# Patient Record
Sex: MALE | Race: WHITE | Employment: STUDENT | URBAN - METROPOLITAN AREA
[De-identification: names, ages, dates, MRNs, and addresses within clinical notes are randomized per-mention and may not be internally consistent; named-entity substitution may affect disease eponyms.]

---
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
## 2021-01-26 ENCOUNTER — LAB ENCOUNTER (OUTPATIENT)
Dept: LAB | Age: 21
End: 2021-01-26
Attending: PEDIATRICS
Payer: COMMERCIAL

## 2021-01-26 DIAGNOSIS — R51.9 NONINTRACTABLE HEADACHE, UNSPECIFIED CHRONICITY PATTERN, UNSPECIFIED HEADACHE TYPE: ICD-10-CM

## 2021-01-26 DIAGNOSIS — Z20.828 EXPOSURE TO SARS-ASSOCIATED CORONAVIRUS: ICD-10-CM

## 2021-01-27 LAB — SARS-COV-2 RNA RESP QL NAA+PROBE: NOT DETECTED

## 2021-09-02 ENCOUNTER — APPOINTMENT (OUTPATIENT)
Dept: GENERAL RADIOLOGY | Age: 21
End: 2021-09-02
Attending: EMERGENCY MEDICINE
Payer: COMMERCIAL

## 2021-09-02 ENCOUNTER — HOSPITAL ENCOUNTER (OUTPATIENT)
Age: 21
Discharge: HOME | End: 2021-09-02
Attending: EMERGENCY MEDICINE
Payer: COMMERCIAL

## 2021-09-02 VITALS
TEMPERATURE: 98 F | HEART RATE: 77 BPM | OXYGEN SATURATION: 98 % | RESPIRATION RATE: 20 BRPM | BODY MASS INDEX: 26.6 KG/M2 | HEIGHT: 71 IN | WEIGHT: 190 LBS | DIASTOLIC BLOOD PRESSURE: 45 MMHG | SYSTOLIC BLOOD PRESSURE: 106 MMHG

## 2021-09-02 DIAGNOSIS — S29.011A INTERCOSTAL MUSCLE STRAIN, INITIAL ENCOUNTER: Primary | ICD-10-CM

## 2021-09-02 PROCEDURE — 99213 OFFICE O/P EST LOW 20 MIN: CPT

## 2021-09-02 PROCEDURE — 71046 X-RAY EXAM CHEST 2 VIEWS: CPT | Performed by: EMERGENCY MEDICINE

## 2021-09-02 NOTE — ED INITIAL ASSESSMENT (HPI)
Right rib pain started last night 10 or 11 pm.denies any injury. Pain when breathing. No pain meds taken.

## 2021-09-02 NOTE — ED PROVIDER NOTES
Patient Seen in: [facility] [place]      History   Patient presents with:  Pain    Stated Complaint: rib area pain    HPI/Subjective:   HPI    71-year-old male with history of anxiety/depression who comes in with focal tenderness of the right low data to display       XR CHEST PA + LAT CHEST (CPT=71046)    Result Date: 9/2/2021  PROCEDURE:  XR CHEST PA + LAT CHEST (CPT=71046)  INDICATIONS:  rib area pain  COMPARISON:  [facility], XR, XR CHEST PA + LAT CHEST (CPT=71020), 4/22/2016, 4:18 PM.  [facility]

## 2021-09-03 ENCOUNTER — APPOINTMENT (OUTPATIENT)
Dept: CT IMAGING | Age: 21
End: 2021-09-03
Attending: EMERGENCY MEDICINE
Payer: COMMERCIAL

## 2021-09-03 ENCOUNTER — HOSPITAL ENCOUNTER (EMERGENCY)
Age: 21
Discharge: HOME | End: 2021-09-03
Attending: EMERGENCY MEDICINE
Payer: COMMERCIAL

## 2021-09-03 VITALS
OXYGEN SATURATION: 98 % | HEIGHT: 71 IN | HEART RATE: 78 BPM | BODY MASS INDEX: 26.6 KG/M2 | RESPIRATION RATE: 16 BRPM | DIASTOLIC BLOOD PRESSURE: 67 MMHG | SYSTOLIC BLOOD PRESSURE: 127 MMHG | TEMPERATURE: 97 F | WEIGHT: 190 LBS

## 2021-09-03 DIAGNOSIS — J18.9 PNEUMONIA OF RIGHT LOWER LOBE DUE TO INFECTIOUS ORGANISM: Primary | ICD-10-CM

## 2021-09-03 LAB
ALBUMIN SERPL-MCNC: 4.6 G/DL (ref 3.4–5)
ALBUMIN/GLOB SERPL: 1.3 {RATIO} (ref 1–2)
ALP LIVER SERPL-CCNC: 77 U/L
ALT SERPL-CCNC: 47 U/L
ANION GAP SERPL CALC-SCNC: 3 MMOL/L (ref 0–18)
AST SERPL-CCNC: 20 U/L (ref 15–37)
BASOPHILS # BLD AUTO: 0.05 X10(3) UL (ref 0–0.2)
BASOPHILS NFR BLD AUTO: 0.4 %
BILIRUB SERPL-MCNC: 1.4 MG/DL (ref 0.1–2)
BUN BLD-MCNC: 12 MG/DL (ref 7–18)
CALCIUM BLD-MCNC: 9.3 MG/DL (ref 8.5–10.1)
CHLORIDE SERPL-SCNC: 105 MMOL/L (ref 98–112)
CO2 SERPL-SCNC: 29 MMOL/L (ref 21–32)
CREAT BLD-MCNC: 1.09 MG/DL
D-DIMER: 0.27 UG/ML FEU (ref ?–0.5)
EOSINOPHIL # BLD AUTO: 0.18 X10(3) UL (ref 0–0.7)
EOSINOPHIL NFR BLD AUTO: 1.6 %
ERYTHROCYTE [DISTWIDTH] IN BLOOD BY AUTOMATED COUNT: 13.3 %
GLOBULIN PLAS-MCNC: 3.6 G/DL (ref 2.8–4.4)
GLUCOSE BLD-MCNC: 97 MG/DL (ref 70–99)
HCT VFR BLD AUTO: 46 %
HGB BLD-MCNC: 15.3 G/DL
IMM GRANULOCYTES # BLD AUTO: 0.07 X10(3) UL (ref 0–1)
IMM GRANULOCYTES NFR BLD: 0.6 %
LIPASE SERPL-CCNC: 54 U/L (ref 73–393)
LYMPHOCYTES # BLD AUTO: 2.32 X10(3) UL (ref 1–4)
LYMPHOCYTES NFR BLD AUTO: 20.9 %
M PROTEIN MFR SERPL ELPH: 8.2 G/DL (ref 6.4–8.2)
MCH RBC QN AUTO: 27.6 PG (ref 26–34)
MCHC RBC AUTO-ENTMCNC: 33.3 G/DL (ref 31–37)
MCV RBC AUTO: 82.9 FL
MONOCYTES # BLD AUTO: 1.08 X10(3) UL (ref 0.1–1)
MONOCYTES NFR BLD AUTO: 9.7 %
NEUTROPHILS # BLD AUTO: 7.42 X10 (3) UL (ref 1.5–7.7)
NEUTROPHILS # BLD AUTO: 7.42 X10(3) UL (ref 1.5–7.7)
NEUTROPHILS NFR BLD AUTO: 66.8 %
OSMOLALITY SERPL CALC.SUM OF ELEC: 284 MOSM/KG (ref 275–295)
PLATELET # BLD AUTO: 270 10(3)UL (ref 150–450)
POTASSIUM SERPL-SCNC: 4 MMOL/L (ref 3.5–5.1)
RBC # BLD AUTO: 5.55 X10(6)UL
SODIUM SERPL-SCNC: 137 MMOL/L (ref 136–145)
WBC # BLD AUTO: 11.1 X10(3) UL (ref 4–11)

## 2021-09-03 PROCEDURE — 74177 CT ABD & PELVIS W/CONTRAST: CPT | Performed by: EMERGENCY MEDICINE

## 2021-09-03 PROCEDURE — 85025 COMPLETE CBC W/AUTO DIFF WBC: CPT | Performed by: EMERGENCY MEDICINE

## 2021-09-03 PROCEDURE — 36415 COLL VENOUS BLD VENIPUNCTURE: CPT

## 2021-09-03 PROCEDURE — 83690 ASSAY OF LIPASE: CPT | Performed by: EMERGENCY MEDICINE

## 2021-09-03 PROCEDURE — 99284 EMERGENCY DEPT VISIT MOD MDM: CPT

## 2021-09-03 PROCEDURE — 99285 EMERGENCY DEPT VISIT HI MDM: CPT

## 2021-09-03 PROCEDURE — 85379 FIBRIN DEGRADATION QUANT: CPT | Performed by: EMERGENCY MEDICINE

## 2021-09-03 PROCEDURE — 71260 CT THORAX DX C+: CPT | Performed by: EMERGENCY MEDICINE

## 2021-09-03 PROCEDURE — 80053 COMPREHEN METABOLIC PANEL: CPT | Performed by: EMERGENCY MEDICINE

## 2021-09-03 RX ORDER — DOXYCYCLINE 100 MG/1
100 CAPSULE ORAL 2 TIMES DAILY
Qty: 14 CAPSULE | Refills: 0 | Status: SHIPPED | OUTPATIENT
Start: 2021-09-03 | End: 2021-09-10

## 2021-09-03 RX ORDER — TRAMADOL HYDROCHLORIDE 50 MG/1
50 TABLET ORAL EVERY 6 HOURS PRN
Qty: 20 TABLET | Refills: 0 | Status: SHIPPED | OUTPATIENT
Start: 2021-09-03

## 2021-09-03 RX ORDER — BUPROPION HYDROCHLORIDE 75 MG/1
50 TABLET ORAL 2 TIMES DAILY
COMMUNITY

## 2021-09-03 NOTE — ED PROVIDER NOTES
Patient Seen in: THE [facility] In [place]      History   Patient presents with:  Pain    Stated Complaint: r rib pain and anterior shoulder pain, seen at urgent care yesterday BIC and ha*    HPI/Subjective:   HPI    28-year-old male pres no rigidity   Lungs: good air exchange and clear   Heart: regular rate rhythm and no murmur   Abdomen: Soft and nontender. No abdominal masses.   No peritoneal signs   MSK: No reproducible tenderness on palpation over the anterior chest wall on the right s FINDINGS:  LUNGS:  No focal consolidation. Normal vascularity. CARDIAC:  Normal size cardiac silhouette. MEDIASTINUM:  Normal. PLEURA:  No pleural effusion or pneumothorax. BONES:  Normal for age. No right rib lesion identified.             CONCLUSION:  N No visible dilatation or calcification. PANCREAS:  No lesion, fluid collection, ductal dilatation, or atrophy. SPLEEN:  No enlargement or focal lesion. KIDNEYS:  No mass, obstruction, or calcification. ADRENALS:  No mass or enlargement.   AORTA:  No ane be causing radiation up into the shoulder. Discussed this with the patient and family and will treat for an infectious process. Home with doxycycline for possible early pneumonia and tramadol for pain management.   Instructed to follow-up with primary car

## 2023-09-26 ENCOUNTER — HOSPITAL ENCOUNTER (OUTPATIENT)
Age: 23
Discharge: HOME | End: 2023-09-26
Payer: COMMERCIAL

## 2023-09-26 VITALS
RESPIRATION RATE: 18 BRPM | HEART RATE: 66 BPM | DIASTOLIC BLOOD PRESSURE: 69 MMHG | BODY MASS INDEX: 27 KG/M2 | OXYGEN SATURATION: 99 % | TEMPERATURE: 97 F | WEIGHT: 195 LBS | SYSTOLIC BLOOD PRESSURE: 124 MMHG

## 2023-09-26 DIAGNOSIS — J06.9 VIRAL URI: Primary | ICD-10-CM

## 2023-09-26 LAB
S PYO AG THROAT QL IA.RAPID: NEGATIVE
SARS-COV-2 RNA RESP QL NAA+PROBE: NOT DETECTED

## 2023-09-26 PROCEDURE — 99212 OFFICE O/P EST SF 10 MIN: CPT

## 2023-09-26 PROCEDURE — 87651 STREP A DNA AMP PROBE: CPT | Performed by: NURSE PRACTITIONER

## 2023-09-26 PROCEDURE — 99213 OFFICE O/P EST LOW 20 MIN: CPT

## 2023-09-26 NOTE — DISCHARGE INSTRUCTIONS
Rest and drink plenty of fluids. This will help to thin the mucous in the back of your throat. Take Tylenol and/or ibuprofen as needed for pain or fever. Use Zyrtec, Claritin, or Allegra to help with nasal drainage. You can also take Sudafed to help with sinus pressure or pain. Get the medication behind the pharmacy counter. Take Robitussin or Delsym as needed for cough. Follow up with your PCP in 5-7 days. Your symptoms should improve in the next 7-10 days; however, the cough can linger for much longer. Thank you for choosing [place] [address] for your care.

## 2023-10-24 ENCOUNTER — OFFICE VISIT (OUTPATIENT)
Dept: URGENT CARE | Age: 23
End: 2023-10-24

## 2023-10-24 ENCOUNTER — APPOINTMENT (OUTPATIENT)
Dept: URGENT CARE | Age: 23
End: 2023-10-24

## 2023-10-24 VITALS
SYSTOLIC BLOOD PRESSURE: 108 MMHG | BODY MASS INDEX: 27.92 KG/M2 | OXYGEN SATURATION: 97 % | DIASTOLIC BLOOD PRESSURE: 72 MMHG | WEIGHT: 195 LBS | TEMPERATURE: 99 F | RESPIRATION RATE: 16 BRPM | HEIGHT: 70 IN | HEART RATE: 98 BPM

## 2023-10-24 DIAGNOSIS — J02.9 SORE THROAT: Primary | ICD-10-CM

## 2023-10-24 DIAGNOSIS — U07.1 COVID-19 VIRUS INFECTION: ICD-10-CM

## 2023-10-24 LAB
FLUAV AG UPPER RESP QL IA.RAPID: NEGATIVE
FLUBV AG UPPER RESP QL IA.RAPID: NEGATIVE
INTERNAL PROCEDURAL CONTROLS ACCEPTABLE: YES
S PYO AG THROAT QL IA.RAPID: NEGATIVE
SARS-COV+SARS-COV-2 AG RESP QL IA.RAPID: DETECTED
TEST LOT EXPIRATION DATE: ABNORMAL
TEST LOT EXPIRATION DATE: NORMAL
TEST LOT NUMBER: ABNORMAL
TEST LOT NUMBER: NORMAL

## 2023-10-24 PROCEDURE — 99202 OFFICE O/P NEW SF 15 MIN: CPT | Performed by: NURSE PRACTITIONER

## 2023-10-24 PROCEDURE — 87428 SARSCOV & INF VIR A&B AG IA: CPT | Performed by: NURSE PRACTITIONER

## 2023-10-24 PROCEDURE — 87880 STREP A ASSAY W/OPTIC: CPT | Performed by: NURSE PRACTITIONER

## 2023-10-24 RX ORDER — BENZONATATE 100 MG/1
100 CAPSULE ORAL 3 TIMES DAILY PRN
Qty: 21 CAPSULE | Refills: 0 | Status: SHIPPED | OUTPATIENT
Start: 2023-10-24 | End: 2023-10-31

## 2023-10-24 ASSESSMENT — ENCOUNTER SYMPTOMS
CHILLS: 1
DIAPHORESIS: 0
FEVER: 1
COUGH: 1
CHANGE IN BOWEL HABIT: 0
FATIGUE: 1
SWOLLEN GLANDS: 0
SORE THROAT: 1
VOMITING: 0
NAUSEA: 1
HEADACHES: 1
ABDOMINAL PAIN: 0

## 2023-10-24 ASSESSMENT — PAIN SCALES - GENERAL: PAINLEVEL: 7

## 2024-02-02 ENCOUNTER — HOSPITAL ENCOUNTER (OUTPATIENT)
Age: 24
Discharge: HOME | End: 2024-02-02
Payer: COMMERCIAL

## 2024-02-02 VITALS
WEIGHT: 185 LBS | RESPIRATION RATE: 16 BRPM | HEIGHT: 71 IN | SYSTOLIC BLOOD PRESSURE: 130 MMHG | DIASTOLIC BLOOD PRESSURE: 77 MMHG | BODY MASS INDEX: 25.9 KG/M2 | TEMPERATURE: 97 F | HEART RATE: 73 BPM | OXYGEN SATURATION: 97 %

## 2024-02-02 DIAGNOSIS — M53.82 MUSCULOSKELETAL DISORDER INVOLVING SUBOCCIPITAL REGION: Primary | ICD-10-CM

## 2024-02-02 PROBLEM — F41.9 ANXIETY DISORDER: Status: ACTIVE | Noted: 2024-02-02

## 2024-02-02 PROBLEM — F32.A DEPRESSIVE DISORDER: Status: ACTIVE | Noted: 2024-02-02

## 2024-02-02 PROCEDURE — 99213 OFFICE O/P EST LOW 20 MIN: CPT

## 2024-02-02 PROCEDURE — 99214 OFFICE O/P EST MOD 30 MIN: CPT

## 2024-02-02 RX ORDER — NAPROXEN 500 MG/1
500 TABLET ORAL 2 TIMES DAILY PRN
Qty: 20 TABLET | Refills: 0 | Status: SHIPPED | OUTPATIENT
Start: 2024-02-02

## 2024-02-02 NOTE — DISCHARGE INSTRUCTIONS
Supportive Care measures for musculoskeletal pain    - Alternate ice / heat as tolerated   - Drink plenty of water (approx. 4-6 bottle equivalents per day)   - Naproxen, Ibuprofen, or Tylenol for pain as needed   - You may benefit from over-the-counter topical pain medication such as: Diclofenac (Voltaren), Icy/Hot, Biofreeze, Bengay, etc.   - You may benefit from massage, acupuncture, and/or dry needling   - Avoid excessive twisting / bending / lifting   - Avoid looking down (e.g. texting) as this worsens strain   - Use proper body posture (sitting up straight, shoulder back, etc.)   - Expect symptoms to start improving over next few days

## 2024-02-02 NOTE — ED PROVIDER NOTES
History     Chief Complaint   Patient presents with    Headache       Subjective:   HPI    Yahir Bhakta, 23 year old male with notable medical history of n/a who presents with headache.  Patient reports HA started approx 4-5 days ago w/o precipitating events, and with pain sometimes wrapping around the sides of his head. Denies vision changes, n/v, falls, trauma.  PATIENT does admit to doing squats with a heavy bar across his upper back / shoulders the day before symptoms starting. He is taking Tylenol w/o much relief. Admits to adequate water intake.        Objective:   Past Medical History:   Diagnosis Date    Anxiety               Past Surgical History:   Procedure Laterality Date    ORAL SURGERY                  Social History     Socioeconomic History    Marital status: Single   Tobacco Use    Smoking status: Never     Passive exposure: Never    Smokeless tobacco: Never   Vaping Use    Vaping Use: Never used   Substance and Sexual Activity    Alcohol use: Never    Drug use: Never              Review of Systems   Neurological:  Positive for headaches.   All other systems reviewed and are negative.        Constitutional and vital signs reviewed.      All other systems reviewed and negative except as noted above.    I have reviewed the family history, social history, allergies, and outpatient medications.     History reviewed from EMR: Encounters, problem list, allergies, medications      Physical Exam     ED Triage Vitals   BP 02/02/24 1408 130/77   Pulse 02/02/24 1408 73   Resp 02/02/24 1408 16   Temp 02/02/24 1412 96.7 °F (35.9 °C)   Temp src 02/02/24 1412 Temporal   SpO2 02/02/24 1408 97 %   O2 Device 02/02/24 1408 None (Room air)       Current:/77   Pulse 73   Temp 96.7 °F (35.9 °C) (Temporal)   Resp 16   Ht 180.3 cm (5' 11\")   Wt 83.9 kg   SpO2 97%   BMI 25.80 kg/m²       Physical Exam  Vitals and nursing note reviewed.   Constitutional:       General: He is not in acute distress.      Appearance: Normal appearance. He is normal weight. He is not ill-appearing or toxic-appearing.   HENT:      Head: Normocephalic and atraumatic.      Right Ear: External ear normal.      Left Ear: External ear normal.      Nose: Nose normal. No congestion or rhinorrhea.      Mouth/Throat:      Mouth: Mucous membranes are moist.   Eyes:      Extraocular Movements: Extraocular movements intact.      Conjunctiva/sclera: Conjunctivae normal.      Pupils: Pupils are equal, round, and reactive to light.   Neck:        Comments: Tenderness to palpation of occipital musculature.  Cardiovascular:      Rate and Rhythm: Normal rate.      Pulses: Normal pulses.   Pulmonary:      Effort: Pulmonary effort is normal. No respiratory distress.   Musculoskeletal:         General: No swelling, tenderness or signs of injury. Normal range of motion.      Cervical back: Full passive range of motion without pain and normal range of motion. Muscular tenderness present. No spinous process tenderness.   Skin:     General: Skin is warm and dry.      Capillary Refill: Capillary refill takes less than 2 seconds.   Neurological:      General: No focal deficit present.      Mental Status: He is alert and oriented to person, place, and time. Mental status is at baseline.      GCS: GCS eye subscore is 4. GCS verbal subscore is 5. GCS motor subscore is 6.      Sensory: Sensation is intact.      Motor: Motor function is intact.      Coordination: Coordination is intact.      Gait: Gait is intact.   Psychiatric:         Attention and Perception: Attention normal.         Mood and Affect: Mood normal. Affect is flat.         Behavior: Behavior normal.         Thought Content: Thought content normal.         Judgment: Judgment normal.            ED Course     Labs Reviewed - No data to display  No orders to display       Vitals:    02/02/24 1408 02/02/24 1412   BP: 130/77    Pulse: 73    Resp: 16    Temp:  96.7 °F (35.9 °C)   TempSrc:  Temporal   SpO2:  97%    Weight: 83.9 kg    Height: 180.3 cm (5' 11\")             ELKIN MAXWELL Yony, 23 year old male with medical history as noted above who presents with headache   - Patient in NAD, VSS   - Benign neuro exam   - HPI and exam c/w muscle etiology   - Supportive care discussed   - School note provided        ** See ED course for additional information on care provided / interventions / notable events throughout patient's encounter.    ** See below for home care instructions (if applicable)         I have independently reviewed the radiology images, clinical lab results, and ECG tracings as described above (if applicable)        Medical Decision Making  Risk  OTC drugs.  Prescription drug management.        Disposition and Plan     Clinical Impression:  1. Musculoskeletal disorder involving suboccipital region         Disposition:  Discharge  2/2/2024  2:26 pm    Follow-up:  Nilton Morel MD  [address]  [place] [address]  [phone]      As needed          Medications Prescribed:  Discharge Medication List as of 2/2/2024  2:28 PM        START taking these medications    Details   naproxen 500 MG Oral Tab Take 1 tablet (500 mg total) by mouth 2 (two) times daily as needed., Normal, Disp-20 tablet, R-0             The above patient (and/or guardian) was made aware that an appropriate evaluation has been performed, and that no additional testing is required at this time. In my medical judgment, there is currently no evidence of an immediate life-threatening or surgical condition, therefore discharge is indicated at this time. The patient (and/or guardian) was advised that a small risk still exists that a serious condition could develop. The patient was instructed to arrange close follow-up with their primary care provider (or the referral provider given today). The patient received written and verbal instructions regarding their condition / concerns, demonstrated understanding, and is agreement  with the outpatient treatment plan.        Home care instructions:    Supportive Care measures for musculoskeletal pain    - Alternate ice / heat as tolerated   - Drink plenty of water (approx. 4-6 bottle equivalents per day)   - Naproxen, Ibuprofen, or Tylenol for pain as needed   - You may benefit from over-the-counter topical pain medication such as: Diclofenac (Voltaren), Icy/Hot, Biofreeze, Bengay, etc.   - You may benefit from massage, acupuncture, and/or dry needling   - Avoid excessive twisting / bending / lifting   - Avoid looking down (e.g. texting) as this worsens strain   - Use proper body posture (sitting up straight, shoulder back, etc.)   - Expect symptoms to start improving over next few days      Sergio Dotson, DNP, APRN, AGACNP-BC, FNP-C, CNL  Adult-Gerontology Acute Care & Family Nurse Practitioner  [facility]

## 2024-02-08 ENCOUNTER — HOSPITAL ENCOUNTER (OUTPATIENT)
Dept: GENERAL RADIOLOGY | Age: 24
Discharge: HOME | End: 2024-02-08
Attending: NURSE PRACTITIONER
Payer: COMMERCIAL

## 2024-02-08 DIAGNOSIS — G44.219 EPISODIC TENSION-TYPE HEADACHE, NOT INTRACTABLE: ICD-10-CM

## 2024-02-08 PROCEDURE — 72040 X-RAY EXAM NECK SPINE 2-3 VW: CPT | Performed by: NURSE PRACTITIONER

## 2024-07-26 ENCOUNTER — HOSPITAL ENCOUNTER (EMERGENCY)
Age: 24
Discharge: HOME | End: 2024-07-26
Attending: EMERGENCY MEDICINE
Payer: COMMERCIAL

## 2024-07-26 VITALS
HEIGHT: 70 IN | HEART RATE: 66 BPM | SYSTOLIC BLOOD PRESSURE: 120 MMHG | OXYGEN SATURATION: 97 % | BODY MASS INDEX: 25.77 KG/M2 | DIASTOLIC BLOOD PRESSURE: 71 MMHG | WEIGHT: 180 LBS | RESPIRATION RATE: 17 BRPM | TEMPERATURE: 97 F

## 2024-07-26 DIAGNOSIS — R07.89 CHEST PAIN, ATYPICAL: Primary | ICD-10-CM

## 2024-07-26 LAB
ALBUMIN SERPL-MCNC: 4.7 G/DL (ref 3.4–5)
ALBUMIN/GLOB SERPL: 1.4 {RATIO} (ref 1–2)
ALP LIVER SERPL-CCNC: 82 U/L
ALT SERPL-CCNC: 32 U/L
ANION GAP SERPL CALC-SCNC: 7 MMOL/L (ref 0–18)
AST SERPL-CCNC: 21 U/L (ref 15–37)
ATRIAL RATE: 71 BPM
BASOPHILS # BLD AUTO: 0.07 X10(3) UL (ref 0–0.2)
BASOPHILS NFR BLD AUTO: 0.5 %
BILIRUB SERPL-MCNC: 0.9 MG/DL (ref 0.1–2)
BUN BLD-MCNC: 13 MG/DL (ref 9–23)
CALCIUM BLD-MCNC: 9.6 MG/DL (ref 8.5–10.1)
CHLORIDE SERPL-SCNC: 105 MMOL/L (ref 98–112)
CO2 SERPL-SCNC: 24 MMOL/L (ref 21–32)
CREAT BLD-MCNC: 1.17 MG/DL
D DIMER PPP FEU-MCNC: <0.27 UG/ML FEU (ref ?–0.5)
EGFRCR SERPLBLD CKD-EPI 2021: 90 ML/MIN/1.73M2 (ref 60–?)
EOSINOPHIL # BLD AUTO: 0.29 X10(3) UL (ref 0–0.7)
EOSINOPHIL NFR BLD AUTO: 2.3 %
ERYTHROCYTE [DISTWIDTH] IN BLOOD BY AUTOMATED COUNT: 12.5 %
GLOBULIN PLAS-MCNC: 3.3 G/DL (ref 2.8–4.4)
GLUCOSE BLD-MCNC: 95 MG/DL (ref 70–99)
HCT VFR BLD AUTO: 45.9 %
HGB BLD-MCNC: 16 G/DL
IMM GRANULOCYTES # BLD AUTO: 0.04 X10(3) UL (ref 0–1)
IMM GRANULOCYTES NFR BLD: 0.3 %
LYMPHOCYTES # BLD AUTO: 4.41 X10(3) UL (ref 1–4)
LYMPHOCYTES NFR BLD AUTO: 34.5 %
MCH RBC QN AUTO: 27.8 PG (ref 26–34)
MCHC RBC AUTO-ENTMCNC: 34.9 G/DL (ref 31–37)
MCV RBC AUTO: 79.8 FL
MONOCYTES # BLD AUTO: 1.05 X10(3) UL (ref 0.1–1)
MONOCYTES NFR BLD AUTO: 8.2 %
NEUTROPHILS # BLD AUTO: 6.91 X10 (3) UL (ref 1.5–7.7)
NEUTROPHILS # BLD AUTO: 6.91 X10(3) UL (ref 1.5–7.7)
NEUTROPHILS NFR BLD AUTO: 54.2 %
OSMOLALITY SERPL CALC.SUM OF ELEC: 282 MOSM/KG (ref 275–295)
P AXIS: 70 DEGREES
P-R INTERVAL: 174 MS
PLATELET # BLD AUTO: 271 10(3)UL (ref 150–450)
POTASSIUM SERPL-SCNC: 3.7 MMOL/L (ref 3.5–5.1)
PROT SERPL-MCNC: 8 G/DL (ref 6.4–8.2)
Q-T INTERVAL: 382 MS
QRS DURATION: 96 MS
QTC CALCULATION (BEZET): 415 MS
R AXIS: 62 DEGREES
RBC # BLD AUTO: 5.75 X10(6)UL
SODIUM SERPL-SCNC: 136 MMOL/L (ref 136–145)
T AXIS: 41 DEGREES
TROPONIN I SERPL HS-MCNC: <3 NG/L
VENTRICULAR RATE: 71 BPM
WBC # BLD AUTO: 12.8 X10(3) UL (ref 4–11)

## 2024-07-26 PROCEDURE — 85379 FIBRIN DEGRADATION QUANT: CPT | Performed by: EMERGENCY MEDICINE

## 2024-07-26 PROCEDURE — 84484 ASSAY OF TROPONIN QUANT: CPT | Performed by: EMERGENCY MEDICINE

## 2024-07-26 PROCEDURE — 99284 EMERGENCY DEPT VISIT MOD MDM: CPT

## 2024-07-26 PROCEDURE — 96374 THER/PROPH/DIAG INJ IV PUSH: CPT

## 2024-07-26 PROCEDURE — 80053 COMPREHEN METABOLIC PANEL: CPT | Performed by: EMERGENCY MEDICINE

## 2024-07-26 PROCEDURE — 85025 COMPLETE CBC W/AUTO DIFF WBC: CPT | Performed by: EMERGENCY MEDICINE

## 2024-07-26 PROCEDURE — 93010 ELECTROCARDIOGRAM REPORT: CPT

## 2024-07-26 PROCEDURE — 93005 ELECTROCARDIOGRAM TRACING: CPT

## 2024-07-26 RX ORDER — MAGNESIUM HYDROXIDE/ALUMINUM HYDROXICE/SIMETHICONE 120; 1200; 1200 MG/30ML; MG/30ML; MG/30ML
30 SUSPENSION ORAL ONCE
Status: COMPLETED | OUTPATIENT
Start: 2024-07-26 | End: 2024-07-26

## 2024-07-26 RX ORDER — HYDROXYZINE 50 MG/1
50 TABLET, FILM COATED ORAL 3 TIMES DAILY PRN
COMMUNITY

## 2024-07-26 RX ORDER — KETOROLAC TROMETHAMINE 30 MG/ML
30 INJECTION, SOLUTION INTRAMUSCULAR; INTRAVENOUS ONCE
Status: COMPLETED | OUTPATIENT
Start: 2024-07-26 | End: 2024-07-26

## 2024-07-26 NOTE — ED INITIAL ASSESSMENT (HPI)
Pt to ED with left sided chest pain since Friday, worse at night when laying down, pt also reports left arm tingling.    Current every day smoker

## 2024-07-26 NOTE — ED PROVIDER NOTES
Patient Seen in: ward Emergency Department In [place]      History     Chief Complaint   Patient presents with    Arrythmia/Palpitations     Stated Complaint: Chest pain - co palpitations and left arm weakness    Subjective:   HPI    24-year-old male with a history of anxiety presents to the emergency department for complaints of chest pain.  Patient states that he has a chest burning discomfort.  He also complains of feeling of palpitations and left arm weakness.  He states that the weakness is since resolved.  Denies any history of cord artery disease.  Denies any history of hypertension diabetes.  Patient does not smoke.  He denies any exertional chest pain.  He denies any other exacerbating leaving factors.  He denies any pain with deep inspiration.  Denies any shortness of breath    Objective:   Past Medical History:    Anxiety              Past Surgical History:   Procedure Laterality Date    Oral surgery                  Social History     Socioeconomic History    Marital status: Single   Tobacco Use    Smoking status: Never     Passive exposure: Never    Smokeless tobacco: Never   Vaping Use    Vaping status: Never Used   Substance and Sexual Activity    Alcohol use: Yes     Comment: occasional    Drug use: Never     Social Determinants of Health      Received from [facility], [facility]    Housing Stability              Review of Systems    Positive for stated Chief Complaint: Arrythmia/Palpitations    Other systems are as noted in HPI.  Constitutional and vital signs reviewed.      All other systems reviewed and negative except as noted above.    Physical Exam     ED Triage Vitals [07/26/24 0503]   BP (!) 144/94   Pulse 75   Resp 16   Temp 97.1 °F (36.2 °C)   Temp src Temporal   SpO2 100 %   O2 Device None (Room air)       Current Vitals:   Vital Signs  BP: (!) 144/94  Pulse: 75  Resp: 16  Temp: 97.1 °F (36.2 °C)  Temp src: Temporal    Oxygen Therapy  SpO2:  100 %  O2 Device: None (Room air)            Physical Exam    General: Alert and oriented. No acute distress.  HEENT: Normocephalic. No evidence of trauma. Extraocular movements are intact.  Cardiovascular exam: Regular rate and rhythm  Lungs: Clear to auscultation bilaterally.  Abdomen: Soft, nondistended, nontender.  Extremities: No evidence of deformity. No clubbing or cyanosis.  Neuro: No focal deficit is noted.    ED Course     Labs Reviewed   CBC W/ DIFFERENTIAL - Abnormal; Notable for the following components:       Result Value    WBC 12.8 (*)     RBC 5.75 (*)     MCV 79.8 (*)     Lymphocyte Absolute 4.41 (*)     Monocyte Absolute 1.05 (*)     All other components within normal limits   CBC WITH DIFFERENTIAL WITH PLATELET    Narrative:     The following orders were created for panel order CBC With Differential With Platelet.  Procedure                               Abnormality         Status                     ---------                               -----------         ------                     CBC W/ DIFFERENTIAL[[id number]]          Abnormal            Final result                 Please view results for these tests on the individual orders.   COMP METABOLIC PANEL (14)   TROPONIN I HIGH SENSITIVITY     EKG    Rate, intervals and axes as noted on EKG Report.  Rate: 71  Rhythm: Sinus Rhythm  Reading: Normal sinus rhythm.  Ventricular at 71.  No acute ST elevations or depressions.  Rate, axis, normals are noted.  I agree with computer interpretation.           Patient was brought back to the examination room immediately.  The patient was then placed on a cardiac monitor and pulse oximetry was applied.  Patient had an IV established and labs were drawn.    The patient was administered [Maalox 30 cc p.o.] medications for the purposes of treating  [chest burning discomfort].  The patient had good response to these medications.    Patient continued to be observed here in the emergency department.  Patient  remained stable throughout the emergency department observation period.    Findings  of the patient's tests results were discussed with the patient in detail.  They were understanding of these results and their discharge instructions.  All questions were answered       MDM      History is provided by the patient  Differential diagnosis includes acute coronary syndrome versus gastroesophageal reflux versus esophagitis versus pleurisy versus costochondritis versus atypical chest pain versus musculoskeletal chest pain  Past medical history significant for anxiety  Social history is negative for smoking or alcohol abuse    Patient was placed on a cardiac monitor and pulse oximetry.  A peripheral IV was established and labs were drawn.  EKG showed no acute ischemic changes.    WBC is 12.8, hemoglobin 16, hematocrit 45.9, platelet count 271.  CMP and troponin are negative.    Patient's initial workup was negative.  On repeat assessment he states that there  was some improvement with oral Maalox.  But he now complains of a chest discomfort when he takes a deep breath.  Patient is hemodynamically stable.  Pulse ox is 100% on room air.  Will check a D-dimer.  If this is negative feel the patient can be discharged home at that time.    Patient was screened and evaluated during this visit.   As a treating physician attending to the patient, I determined, within reasonable clinical confidence and prior to discharge, that an emergency medical condition was not or was no longer present.  There was no indication for further evaluation, treatment or admission on an emergency basis.  Comprehensive verbal and written discharge and follow-up instructions were provided to help prevent relapse or worsening.  Patient was instructed to follow-up with her primary care provider for further evaluation and treatment, but to return immediately to the ER for worsening, concerning, new, changing or persisting symptoms.  I discussed the case with  the patient and they had no questions, complaints, or concerns.  Patient felt comfortable going home.    ^^Please note that this report has been produced using speech recognition software and may contain errors related to that system including, but not limited to, errors in grammar, punctuation, and spelling, as well as words and phrases that possibly may have been recognized inappropriately.  If there are any questions or concerns, contact the dictating provider for clarification                                   MDM    Disposition and Plan     Clinical Impression:  No diagnosis found.     Disposition:  There is no disposition on file for this visit.  There is no disposition time on file for this visit.    Follow-up:  No follow-up provider specified.        Medications Prescribed:  Current Discharge Medication List

## 2024-07-26 NOTE — DISCHARGE INSTRUCTIONS
Follow-up with your primary care doctor   continue your home medication  Return for any worsening symptoms or new concerns

## 2024-08-14 ENCOUNTER — HOSPITAL ENCOUNTER (OUTPATIENT)
Dept: GENERAL RADIOLOGY | Age: 24
Discharge: HOME | End: 2024-08-14
Attending: FAMILY MEDICINE
Payer: COMMERCIAL

## 2024-08-14 ENCOUNTER — OFFICE VISIT (OUTPATIENT)
Dept: FAMILY MEDICINE CLINIC | Facility: CLINIC | Age: 24
End: 2024-08-14
Payer: COMMERCIAL

## 2024-08-14 VITALS
BODY MASS INDEX: 25.88 KG/M2 | WEIGHT: 180.81 LBS | HEART RATE: 70 BPM | SYSTOLIC BLOOD PRESSURE: 110 MMHG | RESPIRATION RATE: 18 BRPM | OXYGEN SATURATION: 99 % | TEMPERATURE: 98 F | HEIGHT: 70 IN | DIASTOLIC BLOOD PRESSURE: 68 MMHG

## 2024-08-14 DIAGNOSIS — R06.02 SHORTNESS OF BREATH: ICD-10-CM

## 2024-08-14 DIAGNOSIS — F41.9 ANXIETY DISORDER, UNSPECIFIED TYPE: ICD-10-CM

## 2024-08-14 DIAGNOSIS — R06.02 SHORTNESS OF BREATH: Primary | ICD-10-CM

## 2024-08-14 PROCEDURE — 71046 X-RAY EXAM CHEST 2 VIEWS: CPT | Performed by: FAMILY MEDICINE

## 2024-08-14 PROCEDURE — 3078F DIAST BP <80 MM HG: CPT | Performed by: FAMILY MEDICINE

## 2024-08-14 PROCEDURE — 3008F BODY MASS INDEX DOCD: CPT | Performed by: FAMILY MEDICINE

## 2024-08-14 PROCEDURE — 3074F SYST BP LT 130 MM HG: CPT | Performed by: FAMILY MEDICINE

## 2024-08-14 PROCEDURE — 99214 OFFICE O/P EST MOD 30 MIN: CPT | Performed by: FAMILY MEDICINE

## 2024-08-14 RX ORDER — HYDROXYZINE PAMOATE 25 MG/1
25 CAPSULE ORAL
COMMUNITY
Start: 2024-08-09 | End: 2024-08-14

## 2024-08-14 RX ORDER — ATORVASTATIN CALCIUM 10 MG/1
1 TABLET, FILM COATED ORAL DAILY
COMMUNITY
Start: 2024-07-22 | End: 2024-08-14 | Stop reason: ALTCHOICE

## 2024-08-14 RX ORDER — SERTRALINE HYDROCHLORIDE 100 MG/1
100 TABLET, FILM COATED ORAL DAILY
COMMUNITY
Start: 2024-08-09 | End: 2024-08-14

## 2024-08-14 NOTE — PROGRESS NOTES
Subjective:   Yahir Bhakta is a 24 year old male who presents for Lightheadedness (Patient states he has been experiencing lightheadedness with some heart palpitations. Patient went to [place] ER on 7/26/24 for chest pain and any heart issue was ruled out. Patient believes it might be anxiety related.)   Patient to the clinic to day to assess his breathing and chest concerns. Patient states that he has been off of his sertraline for a few weeks because he was doing very well and he was advised to do a trial off the medication. Patient states that he began to have a pressure/pain in his chest. When he went to the ER they had worked up ACS which was negative. Patient states he is no longer worried about his heart as much as he is his lungs he is wondering why he is still having difficulty breathing or taking a full deep breath at times. Patient denies any fever, chills, nausea, or vomiting. Denies any copd, asthma, allergies (seasonal or other), or chronic bronchitis. Since ER visit patient has been instructed to begin taking the sertraline again. Has been taking for about a week now.       History/Other:    Chief Complaint Reviewed and Verified  Nursing Notes Reviewed and   Verified  Tobacco Reviewed  Allergies Reviewed  Medications Reviewed    Social History Reviewed         Tobacco:  He has never smoked tobacco.    Current Outpatient Medications   Medication Sig Dispense Refill    cholecalciferol 1.25 MG (03589 UT) Oral Cap Take 1 capsule (50,000 Units total) by mouth once a week.      sertraline 50 MG Oral Tab Take 1 tablet (50 mg total) by mouth daily.      hydrOXYzine 50 MG Oral Tab Take 1 tablet (50 mg total) by mouth 3 (three) times daily as needed for Itching.           Review of Systems:  Review of Systems   Constitutional:  Positive for activity change. Negative for chills, diaphoresis and fever.   HENT:  Negative for congestion and ear pain.    Respiratory:  Positive for cough, chest tightness and  shortness of breath. Negative for apnea, choking, wheezing and stridor.    Cardiovascular:  Negative for chest pain, palpitations and leg swelling.   Gastrointestinal: Negative.    Endocrine: Negative.    Allergic/Immunologic: Negative.    Neurological:  Positive for light-headedness.   Hematological: Negative.    Psychiatric/Behavioral: Negative.         Objective:   /68 (BP Location: Right arm, Patient Position: Sitting, Cuff Size: adult)   Pulse 70   Temp 97.5 °F (36.4 °C) (Temporal)   Resp 18   Ht 5' 10\" (1.778 m)   Wt 180 lb 12.8 oz (82 kg)   SpO2 99%   BMI 25.94 kg/m²  Estimated body mass index is 25.94 kg/m² as calculated from the following:    Height as of this encounter: 5' 10\" (1.778 m).    Weight as of this encounter: 180 lb 12.8 oz (82 kg).  Physical Exam  Constitutional:       Appearance: Normal appearance. He is well-developed.   HENT:      Head: Normocephalic and atraumatic.      Nose: No congestion or rhinorrhea.      Mouth/Throat:      Mouth: Mucous membranes are moist.      Pharynx: Oropharynx is clear.   Eyes:      Conjunctiva/sclera: Conjunctivae normal.   Cardiovascular:      Rate and Rhythm: Normal rate and regular rhythm.      Heart sounds: Normal heart sounds.   Pulmonary:      Effort: Pulmonary effort is normal.      Breath sounds: Normal breath sounds.   Musculoskeletal:         General: Normal range of motion.      Cervical back: Normal range of motion.   Skin:     General: Skin is warm and dry.   Neurological:      Mental Status: He is alert and oriented to person, place, and time.   Psychiatric:         Mood and Affect: Mood normal.         Behavior: Behavior normal.         Thought Content: Thought content normal.         Judgment: Judgment normal.     Assessment & Plan:   1. Shortness of breath (Primary)  -     XR CHEST PA + LAT CHEST (CPT=71046); Future; Expected date: 08/14/2024  - Other labs and work up negative.   - Minor elevation in whitecount  2. Anxiety disorder,  unspecified type   Follow up with psychiatry scheduled for tomorrow.    Discuss symptoms with psychiatry.    Continue medications as prescribed.    Use hydroxyzine as needed for panic attacks and for sleep as needed.     Follow Up:    Follow up as needed for this condition and schedule yearly visits for medication refills, lab work, and other yearly preventative care.        Tico Barrientos, DAR, 8/14/2024, 11:01 AM

## 2024-09-03 ENCOUNTER — PATIENT MESSAGE (OUTPATIENT)
Dept: FAMILY MEDICINE CLINIC | Facility: CLINIC | Age: 24
End: 2024-09-03

## 2024-09-05 NOTE — TELEPHONE ENCOUNTER
From: Yahir Bhakta  To: Tico Barrientos  Sent: 9/3/2024 12:18 AM CDT  Subject: Chest issues     Hi, I don’t know if you remember my visit but I visited a couple weeks ago with chest issues. I had an xray that said there was bronchitis. I am still having some slight breathing issues and chest discomfort especially when laying down. Is there any way this could be fixed? Thanks,  Yahir Bhakta

## 2025-03-06 ENCOUNTER — OFFICE VISIT (OUTPATIENT)
Dept: FAMILY MEDICINE CLINIC | Facility: CLINIC | Age: 25
End: 2025-03-06
Payer: COMMERCIAL

## 2025-03-06 VITALS
TEMPERATURE: 97 F | RESPIRATION RATE: 16 BRPM | HEART RATE: 78 BPM | DIASTOLIC BLOOD PRESSURE: 70 MMHG | SYSTOLIC BLOOD PRESSURE: 110 MMHG | OXYGEN SATURATION: 99 % | HEIGHT: 70 IN | BODY MASS INDEX: 28.63 KG/M2 | WEIGHT: 200 LBS

## 2025-03-06 DIAGNOSIS — Z00.00 WELL ADULT EXAM: Primary | ICD-10-CM

## 2025-03-06 DIAGNOSIS — R05.2 SUBACUTE COUGH: ICD-10-CM

## 2025-03-06 DIAGNOSIS — Z13.220 SCREENING CHOLESTEROL LEVEL: ICD-10-CM

## 2025-03-06 RX ORDER — ALBUTEROL SULFATE 90 UG/1
2 INHALANT RESPIRATORY (INHALATION) EVERY 4 HOURS PRN
Qty: 6.7 G | Refills: 0 | Status: SHIPPED | OUTPATIENT
Start: 2025-03-06

## 2025-03-06 NOTE — PROGRESS NOTES
Subjective:      Chief Complaint   Patient presents with    Physical     HISTORY OF PRESENT ILLNESS  HPI  HPI obtained per patient report.  Yahir Bhakta is a pleasant 24 year old male presenting for an annual physical.   He also reports a wet-sounding cough and intermittent chest tightness for 1 month. He denies any other associated symptoms. He tried taking an OTC allergy medication without improvement. He reports a history of bronchospasm episodes as a child.     PAST PATIENT HISTORY  Past Medical History:    Anxiety     Past Surgical History:   Procedure Laterality Date    Oral surgery         CURRENT MEDICATIONS  Medications Taking[1]    HEALTH MAINTENANCE  Immunization History   Administered Date(s) Administered    Covid-19 Vaccine [employer] (J&J) 0.5ml 04/08/2021    Covid-19 Vaccine Moderna 50 Mcg/0.25 Ml 11/22/2021    Covid-19 Vaccine Pfizer Bivalent 30mcg/0.3mL 01/07/2023    FLU VAC QIV SPLIT 3 YRS AND OLDER (99764) 10/11/2019    FLULAVAL 6 months & older 0.5 ml Prefilled syringe (87774) 09/30/2020    FLUZONE 6 months and older PFS 0.5 ml (44549) 09/30/2020, 11/22/2021    Meningococcal-Menactra 08/09/2017    TDAP 02/17/2023       ALLERGIES AND DRUG REACTIONS  Allergies[2]    History reviewed. No pertinent family history.  Social History     Socioeconomic History    Marital status: Single   Tobacco Use    Smoking status: Never     Passive exposure: Never    Smokeless tobacco: Never   Vaping Use    Vaping status: Never Used   Substance and Sexual Activity    Alcohol use: Yes     Comment: occasional    Drug use: Never    Sexual activity: Yes     Social Drivers of Health      Received from [facility], [facility]    Housing Stability       Review of Systems   Respiratory:  Positive for cough and chest tightness.    All other systems reviewed and are negative.         Objective:      /70   Pulse 78   Temp 97.2 °F (36.2 °C) (Temporal)   Resp 16   Ht 5' 10\" (1.778 m)    Wt 200 lb (90.7 kg)   SpO2 99%   BMI 28.70 kg/m²   Body mass index is 28.7 kg/m².    Physical Exam  Vitals reviewed.   Constitutional:       General: He is not in acute distress.     Appearance: He is not ill-appearing, toxic-appearing or diaphoretic.   HENT:      Head: Normocephalic and atraumatic.      Nose: Nose normal.      Mouth/Throat:      Mouth: Mucous membranes are moist.      Pharynx: Oropharynx is clear. No oropharyngeal exudate.   Eyes:      General: No scleral icterus.        Right eye: No discharge.         Left eye: No discharge.      Extraocular Movements: Extraocular movements intact.      Conjunctiva/sclera: Conjunctivae normal.   Neck:      Thyroid: No thyroid mass, thyromegaly or thyroid tenderness.   Cardiovascular:      Rate and Rhythm: Normal rate and regular rhythm.      Heart sounds: Normal heart sounds.   Pulmonary:      Effort: Pulmonary effort is normal.      Breath sounds: Normal breath sounds.   Abdominal:      General: Abdomen is flat. Bowel sounds are normal. There is no distension.      Palpations: Abdomen is soft. There is no mass.      Tenderness: There is no abdominal tenderness. There is no guarding or rebound.      Hernia: No hernia is present.   Musculoskeletal:      Cervical back: Neck supple. No tenderness.      Right lower leg: No edema.      Left lower leg: No edema.   Lymphadenopathy:      Cervical: No cervical adenopathy.   Skin:     Findings: No rash.   Neurological:      Mental Status: He is alert and oriented to person, place, and time.   Psychiatric:         Mood and Affect: Mood normal.            Assessment and Plan:      1. Well adult exam (Primary)  -     CBC With Differential With Platelet; Future; Expected date: 03/06/2025  -     Comp Metabolic Panel (14); Future; Expected date: 03/06/2025  -     Lipid Panel; Future; Expected date: 03/06/2025  -     TSH W Reflex To Free T4; Future; Expected date: 03/06/2025  2. Screening cholesterol level  -     Lipid Panel;  Future; Expected date: 03/06/2025  3. Subacute cough  -     Albuterol Sulfate HFA; Inhale 2 puffs into the lungs every 4 (four) hours as needed.  Dispense: 6.7 g; Refill: 0  -     XR CHEST PA + LAT CHEST (CPT=71046); Future; Expected date: 03/06/2025    No follow-ups on file.    - annual lab orders were discussed and provided for him today  - he declined immunizations  - recommended CXR to evaluate his ongoing cough to R/O atypical PNA. He may start an albuterol inhaler as needed at this time. If appears to be due to bronchospasm on his CXR results and if his symptoms are persistent, will start a daily maintenance inhaler or singulair     Patient verbalized understanding of assessment and recommendations. All questions and concerns were addressed.    Electronically signed by Christy Gregory MD         [1]   Outpatient Medications Marked as Taking for the 3/6/25 encounter (Office Visit) with Christy Gregory MD   Medication Sig Dispense Refill    albuterol 108 (90 Base) MCG/ACT Inhalation Aero Soln Inhale 2 puffs into the lungs every 4 (four) hours as needed. 6.7 g 0    sertraline 50 MG Oral Tab Take 1 tablet (50 mg total) by mouth daily.      hydrOXYzine 50 MG Oral Tab Take 1 tablet (50 mg total) by mouth 3 (three) times daily as needed for Itching.     [2]   Allergies  Allergen Reactions    Seasonal OTHER (SEE COMMENTS)     Runny nose and watery eyes

## 2025-03-11 ENCOUNTER — HOSPITAL ENCOUNTER (OUTPATIENT)
Dept: GENERAL RADIOLOGY | Age: 25
Discharge: HOME | End: 2025-03-11
Attending: STUDENT IN AN ORGANIZED HEALTH CARE EDUCATION/TRAINING PROGRAM
Payer: COMMERCIAL

## 2025-03-11 DIAGNOSIS — J45.21 MILD INTERMITTENT REACTIVE AIRWAY DISEASE WITH ACUTE EXACERBATION (HCC): Primary | ICD-10-CM

## 2025-03-11 DIAGNOSIS — R05.2 SUBACUTE COUGH: ICD-10-CM

## 2025-03-11 PROCEDURE — 71046 X-RAY EXAM CHEST 2 VIEWS: CPT | Performed by: STUDENT IN AN ORGANIZED HEALTH CARE EDUCATION/TRAINING PROGRAM

## 2025-03-11 RX ORDER — FLUTICASONE PROPIONATE AND SALMETEROL XINAFOATE 115; 21 UG/1; UG/1
1 AEROSOL, METERED RESPIRATORY (INHALATION) 2 TIMES DAILY
Qty: 8 G | Refills: 0 | Status: SHIPPED | OUTPATIENT
Start: 2025-03-11

## 2025-03-11 NOTE — PROGRESS NOTES
Sundeep Sinclair,     Your chest x-ray findings were consistent with bronchospasm, AKA reactive airway disease. Please add on an Advair inhaler as prescribed twice daily; I sent an initial prescription to your pharmacy for you. Please follow-up in 2-4 weeks. Let me know if you have any other questions in the interim.     I hope you feel better soon,  Dr. Gregory

## 2025-04-01 ENCOUNTER — HOSPITAL ENCOUNTER (OUTPATIENT)
Age: 25
Discharge: HOME | End: 2025-04-01
Payer: COMMERCIAL

## 2025-04-01 VITALS
HEART RATE: 88 BPM | RESPIRATION RATE: 22 BRPM | DIASTOLIC BLOOD PRESSURE: 82 MMHG | TEMPERATURE: 98 F | OXYGEN SATURATION: 97 % | SYSTOLIC BLOOD PRESSURE: 136 MMHG

## 2025-04-01 DIAGNOSIS — J06.9 VIRAL URI: Primary | ICD-10-CM

## 2025-04-01 LAB — S PYO AG THROAT QL IA.RAPID: NEGATIVE

## 2025-04-01 PROCEDURE — 99212 OFFICE O/P EST SF 10 MIN: CPT

## 2025-04-01 PROCEDURE — 99213 OFFICE O/P EST LOW 20 MIN: CPT

## 2025-04-01 PROCEDURE — 87651 STREP A DNA AMP PROBE: CPT | Performed by: PHYSICIAN ASSISTANT

## 2025-04-01 NOTE — ED PROVIDER NOTES
Patient Seen in: [facility]      History     Chief Complaint   Patient presents with    Sore Throat     Stated Complaint: Sore throat,cough    Subjective:   HPI    25 YO male presents to immediate care for evaluation of sore throat, sinus congestion, cough starting 2 days ago.  Denies fever/chills, SOB or any other physical complaints at this time.      Objective:     Past Medical History:    Anxiety              Past Surgical History:   Procedure Laterality Date    Oral surgery                  Social History     Socioeconomic History    Marital status: Single   Tobacco Use    Smoking status: Never     Passive exposure: Never    Smokeless tobacco: Never   Vaping Use    Vaping status: Never Used   Substance and Sexual Activity    Alcohol use: Yes     Comment: occasional    Drug use: Never    Sexual activity: Yes     Social Withings of Health      Received from [facility], [facility]    Housing Stability              Review of Systems    Positive for stated complaint: Sore throat,cough  Other systems are as noted in HPI.  Constitutional and vital signs reviewed.      All other systems reviewed and negative except as noted above.    Physical Exam     ED Triage Vitals [04/01/25 1417]   /82   Pulse 88   Resp 22   Temp 98.3 °F (36.8 °C)   Temp src Oral   SpO2 97 %   O2 Device None (Room air)       Current Vitals:   No data recorded      Physical Exam  Vitals and nursing note reviewed.   Constitutional:       General: He is not in acute distress.     Appearance: Normal appearance. He is not ill-appearing, toxic-appearing or diaphoretic.   HENT:      Mouth/Throat:      Mouth: Mucous membranes are moist.      Pharynx: Posterior oropharyngeal erythema present. No oropharyngeal exudate.   Cardiovascular:      Rate and Rhythm: Normal rate.      Heart sounds: Normal heart sounds.   Pulmonary:      Effort: Pulmonary effort is normal. No respiratory distress.       Breath sounds: Normal breath sounds. No wheezing.   Neurological:      Mental Status: He is alert and oriented to person, place, and time.   Psychiatric:         Behavior: Behavior normal.           ED Course     Labs Reviewed   RAPID STREP A - Normal          MDM      Differential diagnosis considered but not limited to COVID, influenza, other viral URI, strep pharyngitis    Afebrile. Physical exam as above. Strep PCR negative. Likely viral URI.    Supportive care and return instructions discussed.         Medical Decision Making  Amount and/or Complexity of Data Reviewed  Labs: ordered. Decision-making details documented in ED Course.    Risk  OTC drugs.        Disposition and Plan     Clinical Impression:  1. Viral URI         Disposition:  Discharge  4/1/2025  2:56 pm    Follow-up:  [facility]  [address]  [facility] [address]  [phone]    If symptoms worsen          Medications Prescribed:  Discharge Medication List as of 4/1/2025  3:08 PM              Supplementary Documentation:

## 2025-04-17 ENCOUNTER — OFFICE VISIT (OUTPATIENT)
Dept: FAMILY MEDICINE CLINIC | Facility: CLINIC | Age: 25
End: 2025-04-17
Payer: COMMERCIAL

## 2025-04-17 VITALS
HEIGHT: 70 IN | DIASTOLIC BLOOD PRESSURE: 72 MMHG | RESPIRATION RATE: 16 BRPM | WEIGHT: 196 LBS | HEART RATE: 78 BPM | OXYGEN SATURATION: 98 % | SYSTOLIC BLOOD PRESSURE: 118 MMHG | BODY MASS INDEX: 28.06 KG/M2 | TEMPERATURE: 97 F

## 2025-04-17 DIAGNOSIS — J45.30 MILD PERSISTENT REACTIVE AIRWAY DISEASE WITHOUT COMPLICATION (HCC): Primary | ICD-10-CM

## 2025-04-17 PROCEDURE — 99213 OFFICE O/P EST LOW 20 MIN: CPT | Performed by: STUDENT IN AN ORGANIZED HEALTH CARE EDUCATION/TRAINING PROGRAM

## 2025-04-17 PROCEDURE — 3074F SYST BP LT 130 MM HG: CPT | Performed by: STUDENT IN AN ORGANIZED HEALTH CARE EDUCATION/TRAINING PROGRAM

## 2025-04-17 PROCEDURE — 3008F BODY MASS INDEX DOCD: CPT | Performed by: STUDENT IN AN ORGANIZED HEALTH CARE EDUCATION/TRAINING PROGRAM

## 2025-04-17 PROCEDURE — 3078F DIAST BP <80 MM HG: CPT | Performed by: STUDENT IN AN ORGANIZED HEALTH CARE EDUCATION/TRAINING PROGRAM

## 2025-04-17 RX ORDER — FLUTICASONE PROPIONATE AND SALMETEROL XINAFOATE 115; 21 UG/1; UG/1
1 AEROSOL, METERED RESPIRATORY (INHALATION) 2 TIMES DAILY
Qty: 8 G | Refills: 3 | Status: SHIPPED | OUTPATIENT
Start: 2025-04-17

## 2025-04-17 NOTE — PROGRESS NOTES
Subjective:      Chief Complaint   Patient presents with    Cough     F/up - had CXR - taking Advair - states it has helped     HISTORY OF PRESENT ILLNESS  HPI  HPI obtained per patient report.  Yahir Bhakta is a pleasant 24 year old male presenting for follow-up.   He is feeling much better since starting Advair. He reports that he has only had 1 episode of symptoms since starting this inhaler. He is tolerating it well.     PAST PATIENT HISTORY  Past Medical History[1]  Past Surgical History[2]    CURRENT MEDICATIONS  Medications Taking[3]    HEALTH MAINTENANCE  Immunization History   Administered Date(s) Administered    Covid-19 Vaccine [employer] (J&J) 0.5ml 04/08/2021    Covid-19 Vaccine Moderna 50 Mcg/0.25 Ml 11/22/2021    Covid-19 Vaccine Pfizer Bivalent 30mcg/0.3mL 01/07/2023    FLU VAC QIV SPLIT 3 YRS AND OLDER (21104) 10/11/2019    FLULAVAL 6 months & older 0.5 ml Prefilled syringe (82598) 09/30/2020    FLUZONE 6 months and older PFS 0.5 ml (16493) 09/30/2020, 11/22/2021    Meningococcal-Menactra 08/09/2017    TDAP 02/17/2023       ALLERGIES AND DRUG REACTIONS  Allergies[4]    Family History[5]  Short Social Hx on File[6]    Review of Systems   Respiratory:  Positive for cough and chest tightness.    All other systems reviewed and are negative.         Objective:      /72   Pulse 78   Temp 97.3 °F (36.3 °C) (Temporal)   Resp 16   Ht 5' 10\" (1.778 m)   Wt 196 lb (88.9 kg)   SpO2 98%   BMI 28.12 kg/m²   Body mass index is 28.12 kg/m².    Physical Exam  Vitals reviewed.   Constitutional:       General: He is not in acute distress.     Appearance: He is not ill-appearing, toxic-appearing or diaphoretic.   Cardiovascular:      Rate and Rhythm: Normal rate.   Pulmonary:      Effort: Pulmonary effort is normal.      Breath sounds: Normal breath sounds.   Abdominal:      General: Abdomen is flat.   Musculoskeletal:      Cervical back: Neck supple.   Neurological:      Mental Status: He is alert and  oriented to person, place, and time.   Psychiatric:         Mood and Affect: Mood normal.            Assessment and Plan:      1. Mild persistent reactive airway disease without complication (HCC) (Primary)  -     Fluticasone-Salmeterol; Inhale 1 puff into the lungs 2 (two) times daily.  Dispense: 8 g; Refill: 3    Return in about 1 year (around 4/17/2026) for physical.    - his CXR was consistent with RAD  - his symptoms are well controlled with Advair. He was recommended to continue this inhaler as prescribed. He may continue his albuterol inhaler use as needed as well  - he had a history of bronchospasm episodes as a child and was asymptomatic for many years prior to recurrence of symptoms in February. We discussed that his RAD may have been triggered most recently by seasonal allergies vs seasonal weather changes vs a viral infection. His seasonal allergy symptoms typically resolve during the summer. We discussed that he may trial off of his Advair inhaler around that time. If his symptoms remain well controlled, most likely his seasonal allergy symptoms had triggered his RAD episode and we will monitor for seasonal recurrence of his symptoms next year. If his symptoms recur during his trial off of Advair in the summer, we discussed that he will need to continue his inhaler for at least another 6 months     Patient verbalized understanding of assessment and recommendations. All questions and concerns were addressed.    Electronically signed by Christy Gregory MD         [1]   Past Medical History:   Anxiety   [2]   Past Surgical History:  Procedure Laterality Date    Oral surgery     [3]   Outpatient Medications Marked as Taking for the 4/17/25 encounter (Office Visit) with Christy Gregory MD   Medication Sig Dispense Refill    fluticasone-salmeterol 115-21 MCG/ACT Inhalation Aerosol Inhale 1 puff into the lungs 2 (two) times daily. 8 g 3    albuterol 108 (90 Base) MCG/ACT Inhalation Aero Soln Inhale 2 puffs into the  lungs every 4 (four) hours as needed. 6.7 g 0    sertraline 50 MG Oral Tab Take 1 tablet (50 mg total) by mouth daily.      hydrOXYzine 50 MG Oral Tab Take 1 tablet (50 mg total) by mouth 3 (three) times daily as needed for Itching.     [4]   Allergies  Allergen Reactions    Seasonal OTHER (SEE COMMENTS)     Runny nose and watery eyes   [5] No family history on file.  [6]   Social History  Socioeconomic History    Marital status: Single   Tobacco Use    Smoking status: Never     Passive exposure: Never    Smokeless tobacco: Never   Vaping Use    Vaping status: Never Used   Substance and Sexual Activity    Alcohol use: Yes     Comment: occasional    Drug use: Never    Sexual activity: Yes     Social Drivers of Health      Received from [facility]    Housing Stability

## 2025-05-27 ENCOUNTER — PATIENT MESSAGE (OUTPATIENT)
Dept: FAMILY MEDICINE CLINIC | Facility: CLINIC | Age: 25
End: 2025-05-27

## 2025-05-28 ENCOUNTER — LAB ENCOUNTER (OUTPATIENT)
Dept: LAB | Age: 25
End: 2025-05-28
Attending: STUDENT IN AN ORGANIZED HEALTH CARE EDUCATION/TRAINING PROGRAM
Payer: COMMERCIAL

## 2025-05-28 DIAGNOSIS — Z00.00 WELL ADULT EXAM: ICD-10-CM

## 2025-05-28 DIAGNOSIS — Z13.220 SCREENING CHOLESTEROL LEVEL: ICD-10-CM

## 2025-05-28 LAB
ALBUMIN SERPL-MCNC: 5 G/DL (ref 3.2–4.8)
ALBUMIN/GLOB SERPL: 2.2 {RATIO} (ref 1–2)
ALP LIVER SERPL-CCNC: 61 U/L (ref 45–117)
ALT SERPL-CCNC: 21 U/L (ref 10–49)
ANION GAP SERPL CALC-SCNC: 7 MMOL/L (ref 0–18)
AST SERPL-CCNC: 21 U/L (ref ?–34)
BASOPHILS # BLD AUTO: 0.05 X10(3) UL (ref 0–0.2)
BASOPHILS NFR BLD AUTO: 0.8 %
BILIRUB SERPL-MCNC: 0.7 MG/DL (ref 0.3–1.2)
BUN BLD-MCNC: 11 MG/DL (ref 9–23)
CALCIUM BLD-MCNC: 9.6 MG/DL (ref 8.7–10.6)
CHLORIDE SERPL-SCNC: 106 MMOL/L (ref 98–112)
CHOLEST SERPL-MCNC: 150 MG/DL (ref ?–200)
CO2 SERPL-SCNC: 27 MMOL/L (ref 21–32)
CREAT BLD-MCNC: 1.09 MG/DL (ref 0.7–1.3)
EGFRCR SERPLBLD CKD-EPI 2021: 98 ML/MIN/1.73M2 (ref 60–?)
EOSINOPHIL # BLD AUTO: 0.18 X10(3) UL (ref 0–0.7)
EOSINOPHIL NFR BLD AUTO: 2.9 %
ERYTHROCYTE [DISTWIDTH] IN BLOOD BY AUTOMATED COUNT: 13.1 %
FASTING PATIENT LIPID ANSWER: YES
FASTING STATUS PATIENT QL REPORTED: YES
GLOBULIN PLAS-MCNC: 2.3 G/DL (ref 2–3.5)
GLUCOSE BLD-MCNC: 92 MG/DL (ref 70–99)
HCT VFR BLD AUTO: 46.2 % (ref 39–53)
HDLC SERPL-MCNC: 36 MG/DL (ref 40–59)
HGB BLD-MCNC: 15.2 G/DL (ref 13–17.5)
IMM GRANULOCYTES # BLD AUTO: 0.01 X10(3) UL (ref 0–1)
IMM GRANULOCYTES NFR BLD: 0.2 %
LDLC SERPL CALC-MCNC: 83 MG/DL (ref ?–100)
LYMPHOCYTES # BLD AUTO: 1.89 X10(3) UL (ref 1–4)
LYMPHOCYTES NFR BLD AUTO: 30.3 %
MCH RBC QN AUTO: 27.3 PG (ref 26–34)
MCHC RBC AUTO-ENTMCNC: 32.9 G/DL (ref 31–37)
MCV RBC AUTO: 83.1 FL (ref 80–100)
MONOCYTES # BLD AUTO: 0.57 X10(3) UL (ref 0.1–1)
MONOCYTES NFR BLD AUTO: 9.1 %
NEUTROPHILS # BLD AUTO: 3.53 X10 (3) UL (ref 1.5–7.7)
NEUTROPHILS # BLD AUTO: 3.53 X10(3) UL (ref 1.5–7.7)
NEUTROPHILS NFR BLD AUTO: 56.7 %
NONHDLC SERPL-MCNC: 114 MG/DL (ref ?–130)
OSMOLALITY SERPL CALC.SUM OF ELEC: 289 MOSM/KG (ref 275–295)
PLATELET # BLD AUTO: 217 10(3)UL (ref 150–450)
POTASSIUM SERPL-SCNC: 4.1 MMOL/L (ref 3.5–5.1)
PROT SERPL-MCNC: 7.3 G/DL (ref 5.7–8.2)
RBC # BLD AUTO: 5.56 X10(6)UL (ref 4.3–5.7)
SODIUM SERPL-SCNC: 140 MMOL/L (ref 136–145)
TRIGL SERPL-MCNC: 182 MG/DL (ref 30–149)
TSI SER-ACNC: 1.26 UIU/ML (ref 0.55–4.78)
VLDLC SERPL CALC-MCNC: 29 MG/DL (ref 0–30)
WBC # BLD AUTO: 6.2 X10(3) UL (ref 4–11)

## 2025-05-28 PROCEDURE — 80050 GENERAL HEALTH PANEL: CPT | Performed by: STUDENT IN AN ORGANIZED HEALTH CARE EDUCATION/TRAINING PROGRAM

## 2025-05-28 PROCEDURE — 80061 LIPID PANEL: CPT | Performed by: STUDENT IN AN ORGANIZED HEALTH CARE EDUCATION/TRAINING PROGRAM

## 2025-06-02 ENCOUNTER — TELEPHONE (OUTPATIENT)
Dept: FAMILY MEDICINE CLINIC | Facility: CLINIC | Age: 25
End: 2025-06-02

## 2025-06-02 NOTE — PROGRESS NOTES
Hi Yahir,     Your lab results showed mildly high triglycerides and mildly low HDL cholesterol (\"good cholesterol\"), but otherwise looked great! Please try to increase your fiber and omega-3 intake and cut back on your carbohydrate intake to help improve your triglyceride and HDL levels. Your levels can be monitored annually.     Please take care,  Dr. Gregory

## 2025-06-02 NOTE — TELEPHONE ENCOUNTER
Future Appointments   Date Time Provider Department Center   6/3/2025  4:00 PM Christy Gregory MD [facility] [address] [facility] [address]

## 2025-06-02 NOTE — TELEPHONE ENCOUNTER
Appointment for: Yahir Bhakta ([id number])   Visit type: MYCHART EXAM (2964)   6/19/2025 4:30 PM (30 minutes) with Christy Gregory in [facility] [address]      Patient comments:   Hot feeling on left side of chest with no pain

## 2025-06-02 NOTE — TELEPHONE ENCOUNTER
Patient had OV 4/17/25 for asthma/cough.   Started Advair in March.   Has had intermittent \"hot feeling\" on left side of chest.   See patient message from 5/27/25.   Can patient be fit in sooner?

## 2025-06-02 NOTE — TELEPHONE ENCOUNTER
Can double-book tomorrow at 4. Recommend ensuring he is rinsing his mouth out with water after using the Advair inhaler in the interim

## 2025-06-03 ENCOUNTER — OFFICE VISIT (OUTPATIENT)
Dept: FAMILY MEDICINE CLINIC | Facility: CLINIC | Age: 25
End: 2025-06-03
Payer: COMMERCIAL

## 2025-06-03 VITALS
BODY MASS INDEX: 28.67 KG/M2 | SYSTOLIC BLOOD PRESSURE: 124 MMHG | RESPIRATION RATE: 16 BRPM | DIASTOLIC BLOOD PRESSURE: 80 MMHG | OXYGEN SATURATION: 98 % | HEIGHT: 70 IN | HEART RATE: 72 BPM | TEMPERATURE: 97 F | WEIGHT: 200.25 LBS

## 2025-06-03 DIAGNOSIS — R07.9 CHEST PAIN, UNSPECIFIED TYPE: Primary | ICD-10-CM

## 2025-06-03 DIAGNOSIS — R06.01 ORTHOPNEA: ICD-10-CM

## 2025-06-03 PROCEDURE — 3074F SYST BP LT 130 MM HG: CPT | Performed by: STUDENT IN AN ORGANIZED HEALTH CARE EDUCATION/TRAINING PROGRAM

## 2025-06-03 PROCEDURE — 3008F BODY MASS INDEX DOCD: CPT | Performed by: STUDENT IN AN ORGANIZED HEALTH CARE EDUCATION/TRAINING PROGRAM

## 2025-06-03 PROCEDURE — 99213 OFFICE O/P EST LOW 20 MIN: CPT | Performed by: STUDENT IN AN ORGANIZED HEALTH CARE EDUCATION/TRAINING PROGRAM

## 2025-06-03 PROCEDURE — 3079F DIAST BP 80-89 MM HG: CPT | Performed by: STUDENT IN AN ORGANIZED HEALTH CARE EDUCATION/TRAINING PROGRAM

## 2025-06-03 NOTE — PROGRESS NOTES
Subjective:        Chief Complaint   Patient presents with    Chest Pain     Noticed hot feeling on left side chest which it comes and goes and some days more than others - does notices slight mid chest pain      HISTORY OF PRESENT ILLNESS  Chest Pain   Associated symptoms include shortness of breath.     HPI obtained per patient report.  Yahir Bhakta is a pleasant 24 year old male presenting with chest symptoms.     About 1 week ago, he developed an intermittent \"hot\" sensation on the upper left side of his anterior chest. He states that it is not \"burning\" or painful per se. It is nonradiating. During this time period, he has also noticed SOB when lying down. He reports that he began golfing around this time, but denies any other potential injuries or changes in his activity level.    Yesterday however, when he was exercising, he experienced a brief mild aching pain in the middle of his chest.     He denies any other associated symptoms.     PAST PATIENT HISTORY  Past Medical History[1]  Past Surgical History[2]    CURRENT MEDICATIONS  Medications Taking[3]    HEALTH MAINTENANCE  Immunization History   Administered Date(s) Administered    Covid-19 Vaccine [employer] [place] (J&J) 0.5ml 04/08/2021    Covid-19 Vaccine Moderna 50 Mcg/0.25 Ml 11/22/2021    Covid-19 Vaccine Pfizer Bivalent 30mcg/0.3mL 01/07/2023    FLU VAC QIV SPLIT 3 YRS AND OLDER (28994) 10/11/2019    FLULAVAL 6 months & older 0.5 ml Prefilled syringe (24884) 09/30/2020    FLUZONE 6 months and older PFS 0.5 ml (32305) 09/30/2020, 11/22/2021    Meningococcal-Menactra 08/09/2017    TDAP 02/17/2023       ALLERGIES AND DRUG REACTIONS  Allergies[4]    Family History[5]  Short Social Hx on File[6]    Review of Systems   Respiratory:  Positive for shortness of breath.    Cardiovascular:  Positive for chest pain.   All other systems reviewed and are negative.         Objective:      /80   Pulse 72   Temp 97.2 °F (36.2 °C) (Temporal)   Resp 16   Ht 5' 10\"  (1.778 m)   Wt 200 lb 4 oz (90.8 kg)   SpO2 98%   BMI 28.73 kg/m²   Body mass index is 28.73 kg/m².    Physical Exam  Vitals reviewed.   Constitutional:       General: He is not in acute distress.     Appearance: He is not ill-appearing, toxic-appearing or diaphoretic.   HENT:      Head: Normocephalic and atraumatic.   Eyes:      General: No scleral icterus.        Right eye: No discharge.         Left eye: No discharge.      Extraocular Movements: Extraocular movements intact.      Conjunctiva/sclera: Conjunctivae normal.      Pupils: Pupils are equal, round, and reactive to light.   Cardiovascular:      Rate and Rhythm: Normal rate and regular rhythm.      Heart sounds: Normal heart sounds.   Pulmonary:      Effort: Pulmonary effort is normal.      Breath sounds: Normal breath sounds.   Chest:      Chest wall: No tenderness.   Abdominal:      General: Abdomen is flat.   Musculoskeletal:      Cervical back: Neck supple.      Right lower leg: No edema.      Left lower leg: No edema.   Neurological:      Mental Status: He is alert and oriented to person, place, and time.   Psychiatric:         Mood and Affect: Mood normal.            Assessment and Plan:      1. Chest pain, unspecified type (Primary)  -     XR CHEST PA + LAT CHEST (CPT=71046); Future; Expected date: 06/03/2025  2. Orthopnea  -     CARD ECHO 2D DOPPLER (CPT=93306); Future; Expected date: 06/03/2025    No follow-ups on file.    - DD includes costochondritis vs muscle strain vs PNA vs pericarditis/myocarditis/heart failure   - recommended echo and CXR for further evaluation. I will follow-up with him when his results are available   - recommended calling the office if his symptoms worsen or if new symptoms develop in the interim     Patient verbalized understanding of assessment and recommendations. All questions and concerns were addressed.    Electronically signed by Christy Gregory MD       [1]   Past Medical History:   Anxiety    HLD (hyperlipidemia)    [2]   Past Surgical History:  Procedure Laterality Date    Oral surgery     [3]   Outpatient Medications Marked as Taking for the 6/3/25 encounter (Office Visit) with Christy Gregory MD   Medication Sig Dispense Refill    fluticasone-salmeterol 115-21 MCG/ACT Inhalation Aerosol Inhale 1 puff into the lungs 2 (two) times daily. 8 g 3    albuterol 108 (90 Base) MCG/ACT Inhalation Aero Soln Inhale 2 puffs into the lungs every 4 (four) hours as needed. 6.7 g 0    sertraline 50 MG Oral Tab Take 1 tablet (50 mg total) by mouth daily.      hydrOXYzine 50 MG Oral Tab Take 1 tablet (50 mg total) by mouth 3 (three) times daily as needed for Itching.     [4]   Allergies  Allergen Reactions    Seasonal OTHER (SEE COMMENTS)     Runny nose and watery eyes   [5] No family history on file.  [6]   Social History  Socioeconomic History    Marital status: Single   Tobacco Use    Smoking status: Never     Passive exposure: Never    Smokeless tobacco: Never   Vaping Use    Vaping status: Never Used   Substance and Sexual Activity    Alcohol use: Yes     Comment: Very rarely    Drug use: Never    Sexual activity: Yes     Social Drivers of Health      Received from [facility]    Housing Stability

## 2025-06-04 ENCOUNTER — HOSPITAL ENCOUNTER (OUTPATIENT)
Dept: GENERAL RADIOLOGY | Age: 25
Discharge: HOME | End: 2025-06-04
Attending: STUDENT IN AN ORGANIZED HEALTH CARE EDUCATION/TRAINING PROGRAM
Payer: COMMERCIAL

## 2025-06-04 DIAGNOSIS — R07.9 CHEST PAIN, UNSPECIFIED TYPE: ICD-10-CM

## 2025-06-04 PROCEDURE — 71046 X-RAY EXAM CHEST 2 VIEWS: CPT | Performed by: STUDENT IN AN ORGANIZED HEALTH CARE EDUCATION/TRAINING PROGRAM

## 2025-06-05 NOTE — PROGRESS NOTES
Hi Yahir,     Your chest x-ray result is reassuringly normal. Please proceed with your echo test as scheduled.     Dr. Gregory

## 2025-06-10 ENCOUNTER — HOSPITAL ENCOUNTER (OUTPATIENT)
Dept: CV DIAGNOSTICS | Age: 25
Discharge: HOME | End: 2025-06-10
Attending: STUDENT IN AN ORGANIZED HEALTH CARE EDUCATION/TRAINING PROGRAM
Payer: COMMERCIAL

## 2025-06-10 DIAGNOSIS — R06.01 ORTHOPNEA: ICD-10-CM

## 2025-06-10 PROCEDURE — 93306 TTE W/DOPPLER COMPLETE: CPT | Performed by: STUDENT IN AN ORGANIZED HEALTH CARE EDUCATION/TRAINING PROGRAM

## 2025-06-17 DIAGNOSIS — J45.30 MILD PERSISTENT REACTIVE AIRWAY DISEASE WITHOUT COMPLICATION (HCC): ICD-10-CM

## 2025-06-23 RX ORDER — FLUTICASONE PROPIONATE AND SALMETEROL XINAFOATE 115; 21 UG/1; UG/1
1 AEROSOL, METERED RESPIRATORY (INHALATION) 2 TIMES DAILY
Qty: 8 G | Refills: 3 | Status: SHIPPED | OUTPATIENT
Start: 2025-06-23

## 2025-06-23 NOTE — TELEPHONE ENCOUNTER
Requesting Fluticasone-salmeterol 115/21mcg  LOV: 6/3/25  RTC: not noted  Last ACT/AAP: never completed  Filled: 4/17/25 #8g with 3 refills    No future appointments.      Asthma & COPD Medication Protocol [id number]/23/2025 11:14 AM   Protocol Details   ACT Score greater than or equal to 20    ACT recorded in the last 12 months    Appointment in past 6 or next 3 months    Medication is active on med list        No dx of asthma listed on problem list    Failed protocol:  Rx pended and routed for approval/denial

## 2025-08-28 ENCOUNTER — OFFICE VISIT (OUTPATIENT)
Dept: FAMILY MEDICINE CLINIC | Facility: CLINIC | Age: 25
End: 2025-08-28

## 2025-08-28 VITALS
HEIGHT: 70 IN | BODY MASS INDEX: 29.06 KG/M2 | RESPIRATION RATE: 16 BRPM | TEMPERATURE: 98 F | SYSTOLIC BLOOD PRESSURE: 110 MMHG | OXYGEN SATURATION: 98 % | WEIGHT: 203 LBS | DIASTOLIC BLOOD PRESSURE: 72 MMHG | HEART RATE: 73 BPM

## 2025-08-28 DIAGNOSIS — R07.9 CHEST PAIN, UNSPECIFIED TYPE: ICD-10-CM

## 2025-08-28 DIAGNOSIS — Z23 NEED FOR VACCINATION: ICD-10-CM

## 2025-08-28 DIAGNOSIS — J45.30 MILD PERSISTENT REACTIVE AIRWAY DISEASE WITHOUT COMPLICATION (HCC): Primary | ICD-10-CM

## 2025-08-28 PROCEDURE — 90677 PCV20 VACCINE IM: CPT | Performed by: STUDENT IN AN ORGANIZED HEALTH CARE EDUCATION/TRAINING PROGRAM

## 2025-08-28 PROCEDURE — 3078F DIAST BP <80 MM HG: CPT | Performed by: STUDENT IN AN ORGANIZED HEALTH CARE EDUCATION/TRAINING PROGRAM

## 2025-08-28 PROCEDURE — 3008F BODY MASS INDEX DOCD: CPT | Performed by: STUDENT IN AN ORGANIZED HEALTH CARE EDUCATION/TRAINING PROGRAM

## 2025-08-28 PROCEDURE — 90651 9VHPV VACCINE 2/3 DOSE IM: CPT | Performed by: STUDENT IN AN ORGANIZED HEALTH CARE EDUCATION/TRAINING PROGRAM

## 2025-08-28 PROCEDURE — 3074F SYST BP LT 130 MM HG: CPT | Performed by: STUDENT IN AN ORGANIZED HEALTH CARE EDUCATION/TRAINING PROGRAM

## 2025-08-28 PROCEDURE — 90471 IMMUNIZATION ADMIN: CPT | Performed by: STUDENT IN AN ORGANIZED HEALTH CARE EDUCATION/TRAINING PROGRAM

## 2025-08-28 PROCEDURE — 99213 OFFICE O/P EST LOW 20 MIN: CPT | Performed by: STUDENT IN AN ORGANIZED HEALTH CARE EDUCATION/TRAINING PROGRAM

## 2025-08-28 PROCEDURE — 90472 IMMUNIZATION ADMIN EACH ADD: CPT | Performed by: STUDENT IN AN ORGANIZED HEALTH CARE EDUCATION/TRAINING PROGRAM

## 2025-08-28 RX ORDER — ALBUTEROL SULFATE 90 UG/1
2 INHALANT RESPIRATORY (INHALATION) EVERY 4 HOURS PRN
Qty: 6.7 G | Refills: 3 | Status: SHIPPED | OUTPATIENT
Start: 2025-08-28

## 2025-08-28 RX ORDER — FLUTICASONE PROPIONATE AND SALMETEROL XINAFOATE 115; 21 UG/1; UG/1
1 AEROSOL, METERED RESPIRATORY (INHALATION) 2 TIMES DAILY
Qty: 24 G | Refills: 3 | Status: SHIPPED | OUTPATIENT
Start: 2025-08-28

## (undated) NOTE — LETTER
Date & Time: 2/2/2024, 2:26 PM  Patient: Yahir Bhakta  Encounter Provider(s):    Sergio Dotson APRN       To Whom It May Concern:    Yahir Bhakta was seen and treated in our department on 02/02/24. Please excuse him from school until 02/03/24 when he can return if feeling better.    If you have any questions or concerns, please do not hesitate to call.        ________________________________________________  Sergio Dotson DNP, APRN, AGACNP-BC, FNP-C, CNL  Adult-Gerontology Acute Care & Family Nurse Practitioner

## (undated) NOTE — LETTER
Date & Time: 9/30/2020, 6:35 PM  Patient: Janae Castillo  Encounter Provider(s):    Ann Delvalle PA-C       To Whom It May Concern:    Lisa Person was seen and treated in our department on 9/30/2020.  He should not return to work until Released by [facility]

## (undated) NOTE — LETTER
Date & Time: 9/2/2021, 5:28 PM  Patient: Flaquito Chaves  Encounter Provider(s):    Lon Fisher MD       To Whom It May Concern:    Aleksandra Davila was seen and treated in our department on 9/2/2021. He should not return to work until 9/4/21.     If you hav

## (undated) NOTE — LETTER
Date & Time: 8/14/2024, 10:35 AM  Patient: Yahir Bhakta      To Whom It May Concern:    Yahir Bhakta was seen and treated in our department on 8/14/2024. He can return to work.    If you have any questions or concerns, please do not hesitate to call.          _____________________________  Physician/APC Signature

## (undated) NOTE — LETTER
Date & Time: 9/3/2021, 5:05 PM  Patient: Arlette Cummings  Encounter Provider(s):    Lucas Catalan MD       To Whom It May Concern:    Eve Cavanaugh was seen and treated in our department on 9/3/2021. He should not return to work until 9/6/2021.     If you hav

## (undated) NOTE — LETTER
Date & Time: 4/1/2025, 2:56 PM  Patient: Yahir Bhakta  Encounter Provider(s):    Shyann Alfaro PA-C       To Whom It May Concern:    Yahir Bhakta was seen and treated in our department on 4/1/2025. He can return to work on his next scheduled work day, 4/3/2025 .    If you have any questions or concerns, please do not hesitate to call.        _____________________________  Physician/APC Signature